# Patient Record
Sex: MALE | Race: WHITE | Employment: PART TIME | URBAN - METROPOLITAN AREA
[De-identification: names, ages, dates, MRNs, and addresses within clinical notes are randomized per-mention and may not be internally consistent; named-entity substitution may affect disease eponyms.]

---

## 2019-06-10 ENCOUNTER — HOSPITAL ENCOUNTER (EMERGENCY)
Age: 19
Discharge: HOME | End: 2019-06-10
Payer: COMMERCIAL

## 2019-06-10 ENCOUNTER — APPOINTMENT (OUTPATIENT)
Dept: GENERAL RADIOLOGY | Age: 19
End: 2019-06-10
Payer: COMMERCIAL

## 2019-06-10 VITALS
HEART RATE: 87 BPM | DIASTOLIC BLOOD PRESSURE: 76 MMHG | SYSTOLIC BLOOD PRESSURE: 115 MMHG | TEMPERATURE: 98.7 F | RESPIRATION RATE: 16 BRPM | OXYGEN SATURATION: 96 %

## 2019-06-10 DIAGNOSIS — S63.601A SPRAIN OF RIGHT THUMB, INITIAL ENCOUNTER: Primary | ICD-10-CM

## 2019-06-10 PROCEDURE — 73140 X-RAY EXAM OF FINGER(S): CPT

## 2019-06-10 PROCEDURE — 99283 EMERGENCY DEPT VISIT LOW MDM: CPT

## 2019-06-10 RX ORDER — IBUPROFEN 600 MG/1
600 TABLET ORAL EVERY 6 HOURS PRN
Qty: 30 TABLET | Refills: 0 | Status: ON HOLD | OUTPATIENT
Start: 2019-06-10 | End: 2020-08-03 | Stop reason: HOSPADM

## 2019-06-10 ASSESSMENT — PAIN SCALES - GENERAL: PAINLEVEL_OUTOF10: 3

## 2019-06-10 ASSESSMENT — PAIN DESCRIPTION - ORIENTATION: ORIENTATION: RIGHT

## 2019-06-10 ASSESSMENT — ENCOUNTER SYMPTOMS
VOMITING: 0
COLOR CHANGE: 0
NAUSEA: 0
ABDOMINAL PAIN: 0

## 2019-06-10 ASSESSMENT — PAIN DESCRIPTION - PAIN TYPE: TYPE: ACUTE PAIN

## 2019-06-10 ASSESSMENT — PAIN DESCRIPTION - LOCATION: LOCATION: FINGER (COMMENT WHICH ONE)

## 2020-07-30 ENCOUNTER — HOSPITAL ENCOUNTER (INPATIENT)
Age: 20
LOS: 4 days | Discharge: HOME | DRG: 885 | End: 2020-08-03
Attending: PSYCHIATRY & NEUROLOGY | Admitting: PSYCHIATRY & NEUROLOGY
Payer: COMMERCIAL

## 2020-07-30 ENCOUNTER — HOSPITAL ENCOUNTER (EMERGENCY)
Age: 20
Discharge: ANOTHER HOSPITAL | End: 2020-07-30
Attending: EMERGENCY MEDICINE
Payer: COMMERCIAL

## 2020-07-30 VITALS
HEART RATE: 85 BPM | WEIGHT: 185.4 LBS | TEMPERATURE: 97.4 F | SYSTOLIC BLOOD PRESSURE: 106 MMHG | RESPIRATION RATE: 16 BRPM | BODY MASS INDEX: 27.46 KG/M2 | OXYGEN SATURATION: 95 % | DIASTOLIC BLOOD PRESSURE: 65 MMHG | HEIGHT: 69 IN

## 2020-07-30 PROBLEM — F32.A DEPRESSION: Status: ACTIVE | Noted: 2020-07-30

## 2020-07-30 LAB
A/G RATIO: 1.7 (ref 1.1–2.2)
ACETAMINOPHEN LEVEL: <5 UG/ML (ref 10–30)
ALBUMIN SERPL-MCNC: 4.4 G/DL (ref 3.4–5)
ALP BLD-CCNC: 43 U/L (ref 40–129)
ALT SERPL-CCNC: 25 U/L (ref 10–40)
AMPHETAMINE SCREEN, URINE: NORMAL
ANION GAP SERPL CALCULATED.3IONS-SCNC: 14 MMOL/L (ref 3–16)
AST SERPL-CCNC: 28 U/L (ref 15–37)
BARBITURATE SCREEN URINE: NORMAL
BASOPHILS ABSOLUTE: 0.1 K/UL (ref 0–0.2)
BASOPHILS RELATIVE PERCENT: 0.6 %
BENZODIAZEPINE SCREEN, URINE: NORMAL
BILIRUB SERPL-MCNC: 0.4 MG/DL (ref 0–1)
BILIRUBIN URINE: NEGATIVE
BLOOD, URINE: NEGATIVE
BUN BLDV-MCNC: 29 MG/DL (ref 7–20)
CALCIUM SERPL-MCNC: 9.5 MG/DL (ref 8.3–10.6)
CANNABINOID SCREEN URINE: NORMAL
CHLORIDE BLD-SCNC: 107 MMOL/L (ref 99–110)
CLARITY: CLEAR
CO2: 22 MMOL/L (ref 21–32)
COCAINE METABOLITE SCREEN URINE: NORMAL
COLOR: YELLOW
CREAT SERPL-MCNC: 1.1 MG/DL (ref 0.9–1.3)
EOSINOPHILS ABSOLUTE: 0 K/UL (ref 0–0.6)
EOSINOPHILS RELATIVE PERCENT: 0.1 %
ETHANOL: NORMAL MG/DL (ref 0–0.08)
GFR AFRICAN AMERICAN: >60
GFR NON-AFRICAN AMERICAN: >60
GLOBULIN: 2.6 G/DL
GLUCOSE BLD-MCNC: 99 MG/DL (ref 70–99)
GLUCOSE URINE: NEGATIVE MG/DL
HCT VFR BLD CALC: 42.9 % (ref 40.5–52.5)
HEMOGLOBIN: 14.7 G/DL (ref 13.5–17.5)
KETONES, URINE: NEGATIVE MG/DL
LEUKOCYTE ESTERASE, URINE: NEGATIVE
LYMPHOCYTES ABSOLUTE: 2 K/UL (ref 1–5.1)
LYMPHOCYTES RELATIVE PERCENT: 16.7 %
Lab: NORMAL
MCH RBC QN AUTO: 29.4 PG (ref 26–34)
MCHC RBC AUTO-ENTMCNC: 34.3 G/DL (ref 31–36)
MCV RBC AUTO: 85.6 FL (ref 80–100)
METHADONE SCREEN, URINE: NORMAL
MICROSCOPIC EXAMINATION: NORMAL
MONOCYTES ABSOLUTE: 0.7 K/UL (ref 0–1.3)
MONOCYTES RELATIVE PERCENT: 5.9 %
NEUTROPHILS ABSOLUTE: 9.1 K/UL (ref 1.7–7.7)
NEUTROPHILS RELATIVE PERCENT: 76.7 %
NITRITE, URINE: NEGATIVE
OPIATE SCREEN URINE: NORMAL
OXYCODONE URINE: NORMAL
PDW BLD-RTO: 12.6 % (ref 12.4–15.4)
PH UA: 5.5
PH UA: 5.5 (ref 5–8)
PHENCYCLIDINE SCREEN URINE: NORMAL
PLATELET # BLD: 277 K/UL (ref 135–450)
PMV BLD AUTO: 9 FL (ref 5–10.5)
POTASSIUM REFLEX MAGNESIUM: 3.7 MMOL/L (ref 3.5–5.1)
PROPOXYPHENE SCREEN: NORMAL
PROTEIN UA: NEGATIVE MG/DL
RBC # BLD: 5.01 M/UL (ref 4.2–5.9)
SALICYLATE, SERUM: <0.3 MG/DL (ref 15–30)
SARS-COV-2, NAAT: NOT DETECTED
SODIUM BLD-SCNC: 143 MMOL/L (ref 136–145)
SPECIFIC GRAVITY UA: >=1.03 (ref 1–1.03)
TOTAL PROTEIN: 7 G/DL (ref 6.4–8.2)
URINE REFLEX TO CULTURE: NORMAL
URINE TYPE: NORMAL
UROBILINOGEN, URINE: 1 E.U./DL
WBC # BLD: 11.9 K/UL (ref 4–11)

## 2020-07-30 PROCEDURE — G0480 DRUG TEST DEF 1-7 CLASSES: HCPCS

## 2020-07-30 PROCEDURE — 1240000000 HC EMOTIONAL WELLNESS R&B

## 2020-07-30 PROCEDURE — 80307 DRUG TEST PRSMV CHEM ANLYZR: CPT

## 2020-07-30 PROCEDURE — 81003 URINALYSIS AUTO W/O SCOPE: CPT

## 2020-07-30 PROCEDURE — 80053 COMPREHEN METABOLIC PANEL: CPT

## 2020-07-30 PROCEDURE — U0002 COVID-19 LAB TEST NON-CDC: HCPCS

## 2020-07-30 PROCEDURE — 85025 COMPLETE CBC W/AUTO DIFF WBC: CPT

## 2020-07-30 PROCEDURE — 99284 EMERGENCY DEPT VISIT MOD MDM: CPT

## 2020-07-30 RX ORDER — MAGNESIUM HYDROXIDE/ALUMINUM HYDROXICE/SIMETHICONE 120; 1200; 1200 MG/30ML; MG/30ML; MG/30ML
30 SUSPENSION ORAL EVERY 6 HOURS PRN
Status: DISCONTINUED | OUTPATIENT
Start: 2020-07-30 | End: 2020-08-03 | Stop reason: HOSPADM

## 2020-07-30 RX ORDER — HYDROXYZINE PAMOATE 25 MG/1
50 CAPSULE ORAL EVERY 6 HOURS PRN
Status: DISCONTINUED | OUTPATIENT
Start: 2020-07-30 | End: 2020-07-31

## 2020-07-30 RX ORDER — OLANZAPINE 5 MG/1
5 TABLET ORAL EVERY 4 HOURS PRN
Status: DISCONTINUED | OUTPATIENT
Start: 2020-07-30 | End: 2020-07-31

## 2020-07-30 RX ORDER — BENZTROPINE MESYLATE 1 MG/ML
2 INJECTION INTRAMUSCULAR; INTRAVENOUS 2 TIMES DAILY PRN
Status: DISCONTINUED | OUTPATIENT
Start: 2020-07-30 | End: 2020-07-31

## 2020-07-30 RX ORDER — ACETAMINOPHEN 325 MG/1
650 TABLET ORAL EVERY 4 HOURS PRN
Status: DISCONTINUED | OUTPATIENT
Start: 2020-07-30 | End: 2020-07-31

## 2020-07-30 RX ORDER — TRAZODONE HYDROCHLORIDE 50 MG/1
50 TABLET ORAL ONCE
Status: DISCONTINUED | OUTPATIENT
Start: 2020-07-31 | End: 2020-08-03 | Stop reason: HOSPADM

## 2020-07-30 RX ORDER — OLANZAPINE 10 MG/1
10 INJECTION, POWDER, LYOPHILIZED, FOR SOLUTION INTRAMUSCULAR 3 TIMES DAILY PRN
Status: DISCONTINUED | OUTPATIENT
Start: 2020-07-30 | End: 2020-07-31

## 2020-07-30 RX ORDER — TRAZODONE HYDROCHLORIDE 50 MG/1
50 TABLET ORAL NIGHTLY PRN
Status: DISCONTINUED | OUTPATIENT
Start: 2020-07-31 | End: 2020-07-31

## 2020-07-30 SDOH — SOCIAL STABILITY: SOCIAL NETWORK
DO YOU BELONG TO ANY CLUBS OR ORGANIZATIONS SUCH AS CHURCH GROUPS UNIONS, FRATERNAL OR ATHLETIC GROUPS, OR SCHOOL GROUPS?: NO

## 2020-07-30 SDOH — HEALTH STABILITY: PHYSICAL HEALTH: ON AVERAGE, HOW MANY MINUTES DO YOU ENGAGE IN EXERCISE AT THIS LEVEL?: 150+ MIN

## 2020-07-30 SDOH — ECONOMIC STABILITY: FOOD INSECURITY: WITHIN THE PAST 12 MONTHS, YOU WORRIED THAT YOUR FOOD WOULD RUN OUT BEFORE YOU GOT MONEY TO BUY MORE.: NEVER TRUE

## 2020-07-30 SDOH — HEALTH STABILITY: MENTAL HEALTH
STRESS IS WHEN SOMEONE FEELS TENSE, NERVOUS, ANXIOUS, OR CAN'T SLEEP AT NIGHT BECAUSE THEIR MIND IS TROUBLED. HOW STRESSED ARE YOU?: VERY MUCH

## 2020-07-30 SDOH — SOCIAL STABILITY: SOCIAL INSECURITY: WITHIN THE LAST YEAR, HAVE YOU BEEN AFRAID OF YOUR PARTNER OR EX-PARTNER?: PATIENT DECLINED

## 2020-07-30 SDOH — SOCIAL STABILITY: SOCIAL NETWORK: HOW OFTEN DO YOU GET TOGETHER WITH FRIENDS OR RELATIVES?: ONCE A WEEK

## 2020-07-30 SDOH — SOCIAL STABILITY: SOCIAL NETWORK: ARE YOU MARRIED, WIDOWED, DIVORCED, SEPARATED, NEVER MARRIED, OR LIVING WITH A PARTNER?: NEVER MARRIED

## 2020-07-30 SDOH — ECONOMIC STABILITY: INCOME INSECURITY: HOW HARD IS IT FOR YOU TO PAY FOR THE VERY BASICS LIKE FOOD, HOUSING, MEDICAL CARE, AND HEATING?: NOT HARD AT ALL

## 2020-07-30 SDOH — SOCIAL STABILITY: SOCIAL INSECURITY
WITHIN THE LAST YEAR, HAVE YOU BEEN KICKED, HIT, SLAPPED, OR OTHERWISE PHYSICALLY HURT BY YOUR PARTNER OR EX-PARTNER?: PATIENT DECLINED

## 2020-07-30 SDOH — SOCIAL STABILITY: SOCIAL INSECURITY
WITHIN THE LAST YEAR, HAVE YOU BEEN HUMILIATED OR EMOTIONALLY ABUSED IN OTHER WAYS BY YOUR PARTNER OR EX-PARTNER?: PATIENT DECLINED

## 2020-07-30 SDOH — SOCIAL STABILITY: SOCIAL NETWORK: IN A TYPICAL WEEK, HOW MANY TIMES DO YOU TALK ON THE PHONE WITH FAMILY, FRIENDS, OR NEIGHBORS?: ONCE A WEEK

## 2020-07-30 SDOH — ECONOMIC STABILITY: TRANSPORTATION INSECURITY
IN THE PAST 12 MONTHS, HAS THE LACK OF TRANSPORTATION KEPT YOU FROM MEDICAL APPOINTMENTS OR FROM GETTING MEDICATIONS?: NO

## 2020-07-30 SDOH — SOCIAL STABILITY: SOCIAL INSECURITY
WITHIN THE LAST YEAR, HAVE TO BEEN RAPED OR FORCED TO HAVE ANY KIND OF SEXUAL ACTIVITY BY YOUR PARTNER OR EX-PARTNER?: PATIENT DECLINED

## 2020-07-30 SDOH — SOCIAL STABILITY: SOCIAL NETWORK: HOW OFTEN DO YOU ATTEND CHURCH OR RELIGIOUS SERVICES?: NEVER

## 2020-07-30 SDOH — ECONOMIC STABILITY: FOOD INSECURITY: WITHIN THE PAST 12 MONTHS, THE FOOD YOU BOUGHT JUST DIDN'T LAST AND YOU DIDN'T HAVE MONEY TO GET MORE.: NEVER TRUE

## 2020-07-30 SDOH — SOCIAL STABILITY: SOCIAL NETWORK: HOW OFTEN DO YOU ATTENT MEETINGS OF THE CLUB OR ORGANIZATION YOU BELONG TO?: NEVER

## 2020-07-30 SDOH — ECONOMIC STABILITY: TRANSPORTATION INSECURITY
IN THE PAST 12 MONTHS, HAS LACK OF TRANSPORTATION KEPT YOU FROM MEETINGS, WORK, OR FROM GETTING THINGS NEEDED FOR DAILY LIVING?: NO

## 2020-07-30 SDOH — HEALTH STABILITY: PHYSICAL HEALTH: ON AVERAGE, HOW MANY DAYS PER WEEK DO YOU ENGAGE IN MODERATE TO STRENUOUS EXERCISE (LIKE A BRISK WALK)?: 5 DAYS

## 2020-07-30 ASSESSMENT — SLEEP AND FATIGUE QUESTIONNAIRES
DIFFICULTY FALLING ASLEEP: YES
DO YOU USE A SLEEP AID: NO
RESTFUL SLEEP: NO
DIFFICULTY ARISING: NO
DIFFICULTY STAYING ASLEEP: YES
AVERAGE NUMBER OF SLEEP HOURS: 3
DO YOU HAVE DIFFICULTY SLEEPING: YES
SLEEP PATTERN: DIFFICULTY FALLING ASLEEP;DISTURBED/INTERRUPTED SLEEP;EARLY AWAKENING;INSOMNIA

## 2020-07-30 ASSESSMENT — PAIN SCALES - GENERAL
PAINLEVEL_OUTOF10: 0
PAINLEVEL_OUTOF10: 0

## 2020-07-30 NOTE — ED NOTES
Patient presents via private car with mother for SI/HI. He is calm and cooperative while doing assessment. He states he is overwhelmed with life, his relationship, and his job. He states it is nothing in particular, just it all together. He states there are guns in the home but he does not know where they are currently are. Per his mom they are in a safe and the patient does not have the combination.   Patient does not have a time frame to do this and has not attempted suicide in the past.       Manisha Ball RN  07/30/20 8001

## 2020-07-30 NOTE — ED PROVIDER NOTES
[address] [facility]      Pt Name: Julio De  MRN: [id number]  [place] 2000  Date of evaluation: 7/30/2020  Provider: Hannah Reynolds MD    CHIEF COMPLAINT       Chief Complaint   Patient presents with   [employer] Psychiatric Evaluation         HISTORY OF PRESENT ILLNESS   (Location/Symptom, Timing/Onset,Context/Setting, Quality, Duration, Modifying Factors, Severity)  Note limiting factors. Julio De is a 22 y.o. male who presents to the emergency department complaining of worsening depression and anxiety. Patient reports that approximately 6 weeks ago his dose of Celexa was increased and he began having more issues with depression, anxiety, and anger. He then stopped this medication about a month ago, and symptoms have gradually worsened. He states that he now has had more thoughts of both suicidal and homicidal ideation. He states that he feels insignificant, and anyone who thinks that their life is significant is following themselves. He denies any specific suicidal homicidal plans, but states he is thought about assaulting people or possibly swerving into traffic. Per nurses discussion with patient's mother, there are guns in the home, locked in the safe the patient does not have access to. However, patient's mother reported he has been increasingly aggressive out asking her for access to the weapons. He denies any physical complaints at the time of my evaluation, specifically denies chest pain, shortness breath, fever, abdominal pain, nausea or vomiting. NursingNotes were reviewed. REVIEW OF SYSTEMS    (2-9 systems for level 4, 10 or more for level 5)       Constitutional: No fever or chills. Eye: No visual disturbances. No eye pain. Ear/Nose/Mouth/Throat: No nasal congestion. No sore throat. Respiratory: No cough, No shortness of breath, No sputum production. Cardiovascular: No chest pain.  No palpitations. Gastrointestinal: No abdominal pain. No nausea or vomiting  Genitourinary: No dysuria. No hematuria. Hematology/Lymphatics: No bleeding or bruising tendency. Immunologic: No malaise. No swollen glands. Musculoskeletal: No back pain. No joint pain. Integumentary: No rash. No abrasions. Neurologic: No headache. No focal numbness or weakness. PAST MEDICAL HISTORY     Past Medical History:   Diagnosis Date    Environmental allergies          SURGICALHISTORY       Past Surgical History:   Procedure Laterality Date    TYMPANOSTOMY TUBE PLACEMENT Bilateral     WISDOM TOOTH EXTRACTION           CURRENT MEDICATIONS       Previous Medications    IBUPROFEN (ADVIL;MOTRIN) 600 MG TABLET    Take 1 tablet by mouth every 6 hours as needed for Pain       ALLERGIES     Patient has no known allergies. FAMILY HISTORY     No family history on file.        SOCIAL HISTORY       Social History     Socioeconomic History    Marital status: Single     Spouse name: Not on file    Number of children: Not on file    Years of education: Not on file    Highest education level: Not on file   Occupational History    Not on file   Social Needs    Financial resource strain: Not on file    Food insecurity     Worry: Not on file     Inability: Not on file    Transportation needs     Medical: Not on file     Non-medical: Not on file   Tobacco Use    Smoking status: Never Smoker    Smokeless tobacco: Never Used   Substance and Sexual Activity    Alcohol use: No    Drug use: No    Sexual activity: Not on file   Lifestyle    Physical activity     Days per week: Not on file     Minutes per session: Not on file    Stress: Not on file   Relationships    Social connections     Talks on phone: Not on file     Gets together: Not on file     Attends Quaker service: Not on file     Active member of club or organization: Not on file     Attends meetings of clubs or organizations: Not on file     Relationship status: [facility] [address]   Phone [phone]   COMPREHENSIVE METABOLIC PANEL W/ REFLEX TO MG FOR LOW K - Abnormal; Notable for the following components:    BUN 29 (*)     All other components within normal limits    Narrative:     Performed at:  [facility]  [address] [facility] [address]   Phone [phone]   SALICYLATE LEVEL - Abnormal; Notable for the following components:    Salicylate, Serum <7.5 (*)     All other components within normal limits    Narrative:     Performed at:  [facility]  [address], [place] [address]   Phone [phone]   ACETAMINOPHEN LEVEL - Abnormal; Notable for the following components:    Acetaminophen Level <5 (*)     All other components within normal limits    Narrative:     Performed at:  [facility]  [address], [place] [address]   Phone [phone]   ETHANOL    Narrative:     Performed at:  [facility] [employer] [facility]  [address] [address]   Phone [phone]   URINE RT REFLEX TO CULTURE    Narrative:     Performed at:  [facility]  [address], [place] [facility] [address]   Phone [phone]   URINE DRUG SCREEN    Narrative:     Performed at:  [facility]  [address], [facility] [address]   Phone [phone]       All other labs were within normal range or not returned as of this dictation.     EMERGENCY DEPARTMENT COURSE and DIFFERENTIAL DIAGNOSIS/MDM:   Vitals:    Vitals:    20 1213   BP: 127/69   Pulse: 122   Resp: 17   Temp: 99.6 °F (37.6 °C)   TempSrc: Temporal   SpO2: 97%   Weight: 185 lb 6.4 oz (84.1 kg)   Height: 5' 9\" (1.753 m)         Medical decision makin-year-old male with history of anxiety depression who presents for evaluation of worsening depression and anger issues, now with suicidal and homicidal ideation, though no specific plan. Patient has no physical complaints at the time of my evaluation. He is well-appearing, afebrile, with normal vital signs. Physical exam is unremarkable. Medical screening laboratory studies including CBC, CMP are within normal limits. Salicylate, ethanol, Tylenol levels negative. Urinalysis is noninfectious. At this time, patient is medically cleared for further psychiatric evaluation and will be transferred to a psychiatric facility. CRITICAL CARE TIME   Total Critical Care time was 0 minutes, excluding separately reportable procedures. There was a high probability of clinically significant/life threatening deterioration in the patient's condition which required my urgent intervention. CONSULTS:  IP CONSULT TO PSYCHIATRY    PROCEDURES:  Unless otherwise noted below, none         FINAL IMPRESSION      1. Suicidal thoughts          DISPOSITION/PLAN   DISPOSITION Decision To Transfer 07/30/2020 02:17:19 PM      PATIENT REFERRED TO:  No follow-up provider specified.     DISCHARGE MEDICATIONS:  New Prescriptions    No medications on file          (Please note that portions of this note were completed with a voice recognition program.Efforts were made to edit the dictations but occasionally words are mis-transcribed.)    Sparkle Peters MD (electronically signed)  Attending Emergency Physician        Sparkle Peters MD  07/30/20 3568

## 2020-07-30 NOTE — ED NOTES
Report received from [facility]. Pt is awake, alert, drinking water at this time. Pt aware of need for urine sample.       Shakira Romero  07/30/20 4107

## 2020-07-30 NOTE — ED NOTES
Patient in room with  at bedside, he is currently getting into all hospital clothes and belongings will be locked up     Laurie Foss RN  07/30/20 2511

## 2020-07-31 PROBLEM — F32.2 CURRENT SEVERE EPISODE OF MAJOR DEPRESSIVE DISORDER WITHOUT PSYCHOTIC FEATURES WITHOUT PRIOR EPISODE (HCC): Status: ACTIVE | Noted: 2020-07-30

## 2020-07-31 PROBLEM — F41.1 GAD (GENERALIZED ANXIETY DISORDER): Status: ACTIVE | Noted: 2020-07-31

## 2020-07-31 LAB
EKG ATRIAL RATE: 80 BPM
EKG DIAGNOSIS: NORMAL
EKG P AXIS: 28 DEGREES
EKG P-R INTERVAL: 160 MS
EKG Q-T INTERVAL: 388 MS
EKG QRS DURATION: 92 MS
EKG QTC CALCULATION (BAZETT): 447 MS
EKG R AXIS: 79 DEGREES
EKG T AXIS: 64 DEGREES
EKG VENTRICULAR RATE: 80 BPM

## 2020-07-31 PROCEDURE — 93010 ELECTROCARDIOGRAM REPORT: CPT | Performed by: INTERNAL MEDICINE

## 2020-07-31 PROCEDURE — 97535 SELF CARE MNGMENT TRAINING: CPT

## 2020-07-31 PROCEDURE — 99221 1ST HOSP IP/OBS SF/LOW 40: CPT | Performed by: PHYSICIAN ASSISTANT

## 2020-07-31 PROCEDURE — 1240000000 HC EMOTIONAL WELLNESS R&B

## 2020-07-31 PROCEDURE — 99223 1ST HOSP IP/OBS HIGH 75: CPT | Performed by: PSYCHIATRY & NEUROLOGY

## 2020-07-31 PROCEDURE — 93005 ELECTROCARDIOGRAM TRACING: CPT | Performed by: PSYCHIATRY & NEUROLOGY

## 2020-07-31 PROCEDURE — 97165 OT EVAL LOW COMPLEX 30 MIN: CPT

## 2020-07-31 PROCEDURE — 97530 THERAPEUTIC ACTIVITIES: CPT

## 2020-07-31 PROCEDURE — 6370000000 HC RX 637 (ALT 250 FOR IP): Performed by: PSYCHIATRY & NEUROLOGY

## 2020-07-31 RX ORDER — LORAZEPAM 0.5 MG/1
0.5 TABLET ORAL EVERY 4 HOURS PRN
Status: DISCONTINUED | OUTPATIENT
Start: 2020-07-31 | End: 2020-08-03 | Stop reason: HOSPADM

## 2020-07-31 RX ORDER — ACETAMINOPHEN 325 MG/1
650 TABLET ORAL EVERY 4 HOURS PRN
Status: DISCONTINUED | OUTPATIENT
Start: 2020-07-31 | End: 2020-08-03 | Stop reason: HOSPADM

## 2020-07-31 RX ADMIN — SERTRALINE HYDROCHLORIDE 25 MG: 50 TABLET ORAL at 13:20

## 2020-07-31 ASSESSMENT — PATIENT HEALTH QUESTIONNAIRE - PHQ9: SUM OF ALL RESPONSES TO PHQ QUESTIONS 1-9: 20

## 2020-07-31 ASSESSMENT — SLEEP AND FATIGUE QUESTIONNAIRES
DIFFICULTY ARISING: YES
DO YOU USE A SLEEP AID: COMMENT
AVERAGE NUMBER OF SLEEP HOURS: 4
SLEEP PATTERN: DIFFICULTY FALLING ASLEEP;DISTURBED/INTERRUPTED SLEEP
DIFFICULTY FALLING ASLEEP: YES
RESTFUL SLEEP: NO
DIFFICULTY STAYING ASLEEP: YES
DO YOU HAVE DIFFICULTY SLEEPING: YES

## 2020-07-31 ASSESSMENT — LIFESTYLE VARIABLES: HISTORY_ALCOHOL_USE: NO

## 2020-07-31 ASSESSMENT — PAIN SCALES - GENERAL: PAINLEVEL_OUTOF10: 0

## 2020-07-31 NOTE — PROGRESS NOTES
Patient vital signs are obtained. Patient is asked if he is currently suicidal or homicidal. He denied both suicidal and homicidal ideation at this time. He is asked to come to the staff if thoughts of self harm were to occur during his stay. He is agreeable to do this.

## 2020-07-31 NOTE — FLOWSHEET NOTE
07/31/20 0904   Psychiatric History   Psychiatric history treatment Current treatment  ([facility] - Nadja Llamas (Therapist) [phone],VGY on Celexa prescribed by PCP, Dr Shelly Jordan)   Are there any medication issues? Yes  (Stopped taking Celexa d/t increased aggitation.)   Support System   Support system Adequate   Types of Support System Mother;Father   Problems in support system Other (Comment)  (\"complicated\" relationship with ex. \"Not super social.\")   Current Living Situation   Home Living Adequate   Living information Lives with others   Problems with living situation  No   Lack of basic needs No   SSDI/SSI Denied   Other government assistance Denied   Problems with environment Denied   Current abuse issues Denied   Supervised setting None   Relationship problems No   Medical and Self-Care Issues   Relevant medical problems asthma   Relevant self-care issues Denied   Barriers to treatment No   Family Constellation   Spouse/partner-name/age Denied   Children-names/ages Denied   Parents Chapo Leija - mother (46years old);  Nhan Levine - father (48years old); currently    Siblings 3 half sister from father's first marriage but reports no relationship   Support services   (Denied)   Childhood   Raised by Biological mother;Biological father   Biological mother Dilia Bradley father Quintin Russian   Relevant family history Pt reported being raised by his biological parents; pt reported he currently lives with both parents   History of abuse No   Legal History   Legal history No   Juvenile legal history No    Abuse Assessment   Physical Abuse Denies   Verbal Abuse Denies   Emotional abuse Denies   Financial Abuse Denies   Sexual abuse Denies   Elder abuse No   Substance Use   Use of substances  No   Motivation for SA Treatment   Stage of engagement Active treatment/relapse prevention   Motivation for treatment Yes   Current barriers to treatment Other  (denied)   Education   Education Other mother (54 years old); Germaine Lovelace - father (48years old); currently    Siblings 3 half sister from father's first marriage but reports no relationship   Support services   (Denied)   Childhood   Raised by Biological mother;Biological father   Biological mother Mino Chase father Shanice Fu   Relevant family history Pt reported being raised by his biological parents; pt reported he currently lives with both parents   History of abuse No   Legal History   Legal history No   Juvenile legal history No    Abuse Assessment   Physical Abuse Denies   Verbal Abuse Denies   Emotional abuse Denies   Financial Abuse Denies   Sexual abuse Denies   Elder abuse No   Substance Use   Use of substances  No   Motivation for SA Treatment   Stage of engagement Active treatment/relapse prevention   Motivation for treatment Yes   Current barriers to treatment Other  (denied)   Education   Education Other (comment)  (1 year of college at [employer])   Special education   (Denied)   Work History   Currently employed Yes  (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[employer])   Recent job loss or change   (None)    service   (Denied)   /VA involvement Denied   Leisure/Activity   Past interests Pt reported he used to play video games competitively   Present interests Video games,   Current daily activity Wake up at 3 am and go to work; home by 11 am, meal, sleep wake up at 3 pm - 4 pm, watch tv or video games   Social with friends/family No  (Socialize with friends through video game but not socializing with others.)   Cultural and Spiritual   Spiritual concerns No   Cultural concerns No   Collateral Contacts   Contacts Family   Support System JOSE Signed Yes  (Pt reported signing JOSE for parents so that staff can coordinate care. Pt vocalized being jeanine with staff contacting outpatient therapist.)     Therapist met with pt to complete psychosocial assessment and CSSRS.   Pt appeared depressed and reported lack of interest/pleasure in activities, sleep disturbances, depressed mood, and isolating behaviors. Pt reported completing LUIS CARLOS 7 with therapist and having been advised his score was rather high. Pt reported that he does not see a psychiatrist but PCP prescribed antidepressant that made him feel more agitated; pt denied improvement with medications so he stopped taking them. Pt vocalized being motivated to continue therapy however not sure about taking medications. Pt reported having thoughts of wanting to be dead frequently but denied plan or intent; pt expressed that what keeps him from doing anything is not know what there is in the after life and his parents. Pt is agreeable with including parents in treatment while hospitalized; pt reported that he lives with both biological parents.     JOHNNY Clark, Valerie [facility] 320, LSW

## 2020-07-31 NOTE — PROGRESS NOTES
Inpatient Occupational Therapy  Evaluation and Treatment  Discharge Summary    Unit: Adult-[facility]  Date:  7/31/2020  Patient Name:    Jose Ramírez  Admitting diagnosis:  Depression, unspecified depression type [F32.9]  Admit Date:  7/30/2020  Precautions/Restrictions/WB Status/ Lines/ Wounds/ Oxygen: General [facility] precautions     Treatment Time:  4194-0632  Treatment Number: 1     Billable Treatment Time: 25 minutes   Total Treatment Time:   35   minutes    Patient Goals for Therapy:  \" to get better \"      Discharge Recommendations: Home with PRN assist  DME needs for discharge: None       Therapy recommendations for staff: Independent ambulation    History of Present Illness: From H&P, Janna:   \" This is a domiciled, never , partially employed 61-year-old with a history of mood and anxiety symptoms, who was brought to [facility] by his mother after his therapist recommended he present for assessment. He presented with worsening depression, anxiety, and suicidal ideation. \"      PMHx: environmental allergies     Home Health S4 Level Recommendation:  NA  AM-PAC Score: AM-PAC Inpatient Daily Activity Raw Score: 24    Preadmission Environment    Pt. Lives with family (parents)    Preadmission Status / PLOF:  History of falls   No  Pt. Able to drive   Yes   (Pt has his own car and an active license.)   Pt Fully independent with ADL's  Yes  Pt. Required assistance from family for: Independent PTA    Pt. Fully independent for transfers and gait and walked with: No Device     Sleep Hygiene: Pt reports he has always had difficulty sleeping. Pt reports he works nights which impacts his sleep schedule dramatically.    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 Pt agreeable to this OT eval & tx. Upper Extremity ROM:    WFL,  pt able to perform all bed mobility, transfers, and gait without ROM limitation. Upper Extremity Strength:    BUE strength WFL, but not formally assessed w/ MMT    Upper Extremity Sensation    WNL    Upper Extremity Proprioception:  WNL    Coordination and Tone  WNL    Balance  Functional Sitting Balance: WNL  Functional Standing Balance: WNL    Bed mobility:    Supine to sit: Independent  Sit to supine:   Independent  Rolling:    Not Tested  Scooting in sitting:  Independent  Scooting to head of bed: Independent    Bridging:   Not Tested    Transfers:    Sit to stand:  Independent  Stand to sit: Independent  Bed to chair:   Not Tested  Standard toilet: Not Tested  Bed to [facility]:  Not Tested    Dressing:      UE:   Not Tested  LE:    Independent    Bathing:    UE:  Not Tested  LE:  Not Tested    Eating:   Independent    Toileting:  Not Tested    Activity Tolerance   Pt completed therapy session with No adverse symptoms noted w/activity    Positioning Needs:   Pt returned to bed at end of session with needs in place. Exercise / Activities Initiated:   Sleep Hygiene: Pt participated in conversation about sleep difficulties and problem solved different things to try. Coping skills: Pt appropriately defined coping skill and identified 3 skills. Pt reports he has been talking about coping skills with his personal therapist. Pt acknowledges that not all of his coping skills are positive. Groups: Pt participated in conversation about the importance of groups. Pt reports he is \"shy\" and does not like groups of people.  Pt not agreeable to groups at this time. Patient/Family Education:   Role of OT  Recommendations for DC   Sleep, coping, groups    Assessment of Deficits:   Pt demonstrated impaired sleep, however, not limiting completion of daily needs. Pt is not in need of acute skilled occupational therapy services at this time. Pt participated in conversation and education about sleep hygiene. Pt discharged from acute skilled occupational therapy. No goals set. No plan of care established. Please reorder if pt status changes. Thank you.      Orpha Sample, MOT, OTR/L   [id number]           If patient discharges from this facility prior to next visit, this note will serve as the Discharge Summary

## 2020-07-31 NOTE — H&P
Hospital Medicine History & Physical      PCP: Monika Lewis    Date of Admission: 7/30/2020    Date of Service: Pt seen/examined on 7/31/2020    Chief Complaint:  Depression/Anxiety     History Of Present Illness: The patient is a 22 y.o. male with depression who presented to [facility] with complaint of SI/HI and worsening depression. Patient was seen and evaluated in the ED by the ED medical provider, patient was medically cleared for admission to [facility]. This note serves as an admission medical H&P.    PCP: Jessica RUVALCABA  Tobacco use: denies  ETOH use: denies  Illicit drug use: denies    Patient denies any medical complaints. Past Medical History:        Diagnosis Date    Environmental allergies        Past Surgical History:        Procedure Laterality Date    TYMPANOSTOMY TUBE PLACEMENT Bilateral     WISDOM TOOTH EXTRACTION         Medications Prior to Admission:    Prior to Admission medications    Medication Sig Start Date End Date Taking? Authorizing Provider   ibuprofen (ADVIL;MOTRIN) 600 MG tablet Take 1 tablet by mouth every 6 hours as needed for Pain 6/10/19   Briseida Childress PA-C       Allergies:  Patient has no known allergies. Social History:  The patient currently lives at home     TOBACCO:   reports that he has never smoked. He has never used smokeless tobacco.  ETOH:   reports no history of alcohol use. Family History:   Positive as follows:    No family history on file.     REVIEW OF SYSTEMS:       Constitutional: Negative for fever   HENT: Negative for sore throat   Eyes: Negative for redness   Respiratory: Negative  for dyspnea, cough   Cardiovascular: Negative for chest pain   Gastrointestinal: Negative for vomiting, diarrhea   Genitourinary: Negative for hematuria   Musculoskeletal: Negative for arthralgias   Skin: Negative for rash   Neurological: Negative for syncope    Hematological: Negative for easy bruising/bleeding   Psychiatric/Behavorial: Per psychiatry team evaluation     PHYSICAL EXAM:    /65   Pulse 82   Temp 97.8 °F (36.6 °C) (Temporal)   Resp 16   Ht 5' 9\" (1.753 m)   Wt 185 lb (83.9 kg)   SpO2 97%   BMI 27.32 kg/m²     Gen: No distress. Alert. Eyes: No conjunctival injection. ENT: No discharge. Pharynx clear. Neck: Trachea midline. Resp: No accessory muscle use. No crackles. No wheezes. No rhonchi. CV: Regular rate. Regular rhythm. No murmur. No rub. No edema. GI: Non-tender. Non-distended. Normal bowel sounds. Skin: Warm and dry. M/S: No cyanosis. No joint deformity. No clubbing. Neuro: Awake. No focal neurologic deficit on exam.  Cranial nerves II through XII intact. Patient is able to ambulate without difficulty. Psych: Per psychiatry team evaluation     CBC:   Recent Labs     07/30/20  1245   WBC 11.9*   HGB 14.7   HCT 42.9   MCV 85.6        BMP:   Recent Labs     07/30/20  1245      K 3.7      CO2 22   BUN 29*   CREATININE 1.1     LIVER PROFILE:   Recent Labs     07/30/20  1245   AST 28   ALT 25   BILITOT 0.4   ALKPHOS 43     UA:  Recent Labs     07/30/20  1408   COLORU Yellow   PHUR 5.5  5.5   CLARITYU Clear   SPECGRAV >=1.030   LEUKOCYTESUR Negative   UROBILINOGEN 1.0   BILIRUBINUR Negative   BLOODU Negative   GLUCOSEU Negative     CULTURES  SARS-CoV-2: not detected     EKG:  I have reviewed the EKG with the following interpretation:   NSR, normal axis, normal interval, no acute ST segment changes concerning for acute ischemia     RADIOLOGY  No orders to display       Pertinent previous results reviewed   None     ASSESSMENT/PLAN:  Depression   - per psychiatry team    Leukocytosis   - mild   - no acute infectious source     Pt has no medical complaints at this time. Pt was informed that they may have [facility] contact us should any medical concerns arise during this admission.     Jarod Richardson PA-C  7/31/2020 11:45 AM

## 2020-07-31 NOTE — BH NOTE
[facility]  Initial Interdisciplinary Treatment Plan NOTE    Review Date & Time: 7/31/2020 0927    Patient was not in treatment team    Admission Type:   Admission Type: Voluntary(signed voluntary upon arrival to the unit.)    Reason for admission:  Reason for Admission: Patient told staff at [facility] that he was suicidal and homocidal, was thinking about self destruction by running in to traffic and getting hit by a car, or starting arguement with someone to become involved in an assault. patient is feeling anxious and depressed. He is unable to identify why. He is in counseling at [facility].       Estimated Length of Stay Update:  2-3 days  Estimated Discharge Date Update: 8/3/2020    PATIENT STRENGTHS:  Patient Strengths Strengths: No significant Physical Illness, Other(Pt reported having above average intelligence.)  Patient Strengths and Limitations:Limitations: Difficult relationships / poor social skills, Tendency to isolate self, Demonstrates discomfort with /lack of social skills  Addictive Behavior:Addictive Behavior  In the past 3 months, have you felt or has someone told you that you have a problem with:  : Internet Use(Pt reported parents expressing concern over amount of time playing video games.)  Do you have a history of Chemical Use?: No  Do you have a history of Alcohol Use?: No  Do you have a history of Street Drug Abuse?: No  Histroy of Prescripton Drug Abuse?: No  Medical Problems:  Past Medical History:   Diagnosis Date    Environmental allergies        EDUCATION:   Learner Progress Toward Treatment Goals: Reviewed goals and plan of care    Method: Individual    Outcome: Verbalized understanding and Needs reinforcement    PATIENT GOALS:Patient did not attend goals group    PLAN/TREATMENT RECOMMENDATIONS UPDATE: Patient will take medication as prescribed, eat 75% of meals, attend groups, participate in milieu activities, participate in treatment team and care planning for discharge and follow up.     GOALS UPDATE:   Time frame for Short-Term Goals: 1-2 days    Kenia Luis RN

## 2020-07-31 NOTE — PROGRESS NOTES
Discussed patient with Dr. Pearl Boyer. Patient maintains that he is not suicidal or homicidal upon arrival to [facility] and during admission assessment. Suicide precautions were discontinued.

## 2020-07-31 NOTE — FLOWSHEET NOTE
07/31/20 1017   Activities of Daily Living   Patient Requires assistance with daily self-care activities? No   Leisure Activity 1   3 Favorite Leisure Activities \"Play video games. \"   Frequency weekly   Last time this week   Barriers to participating  motivation   Leisure Activity 2   [address]  \"Write. \"   Frequency  weekly   Last time  this week   Barriers to participating  motivation   Leisure Activity 3   [address]  \"Read. \"   Frequency  weekly   Last time  this week   Barriers to participating  motivation   Social   Patient reports spending the majority of their free time alone   Patient verbalizes a preference for spending free time alone   Patients perception of support system recently disrupted  (\"My parents are primarily my support system. I recently broke up with my girlfriend. We still talk, but it's not the same. \")   Patients perception of barriers to socializing with others include(s) lack of motivation/interest;lack of comfort   Social Details \"My parents. \"   Beliefs & Coping   Has difficulty dealing with feelings   Yes   Internalizes feelings/Keeps feelings in Yes   Externalizes feelings through aggressiveness or poor temper control  Yes   Feels uncomfortable around others  Yes   Has difficulty talking to others  Yes   Depends on others for direction or decisions No   Difficulty dealing with anger of others  Yes   Difficulty dealing with own anger  Yes   Difficulty managing stress Yes   Frequently has difficulty with relationships  Yes   which,who,where at work;with friends/peers   Has recently perceived/experienced loss, disappointment, humiliation or failure  Yes   General perception about self does not like self   Attitude about abilities occasionally fails   Locus of Control  sometimes   Belief about recovery Recovery is possible   Patient Identified Strengths  \"My open mindendness. I would say above average intelligence.  I'm not the brightest crayon in the box, but I'm in the box. \"   Patient Identified Limitations  \"I am not a super social person. I like being by myself. \" \"I'm not very confident and secure. I'm not a fan of myself, but I like to spend time with myself. Maybe it's a comfort thing. It's a comfort thing. \"   Perception of most stressful event prior to hospitalization \"It's not the breakup itself, that hurt, it's everything surrounding it. We broke up, but we both still have feelings, but we can't be together. I didn't decide or not want be together. I still get told that she has feelings for me and wants to be together. \"   Perception of changes needed \"My job. Probably find a different one altogether, just because of the whole infastructure and chain of command. Feels like you're constantly playing games and getting told one thing one day and another a diffrent day. 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 \"   Strengths and Limitations   Strengths Independent in basic self-care activities;Demonstrates basic social skills   Limitations Difficult relationships / poor social skills; Tendency to isolate self;Demonstrates discomfort with Lethia Champ of social skills     Therapist met with [place] and completed Leisure Assessment. [facility] requested to be connected to a community psychiatrist which therapist relayed to the hospital .

## 2020-07-31 NOTE — H&P
[address]                 [address] [address]                              HISTORY AND PHYSICAL    PATIENT NAME: Diane Mednosa                       :        2000  MED REC NO:   [id number]                          ROOM:       2319  ACCOUNT NO:   [de-identified]                           ADMIT DATE: 2020  PROVIDER:     Katelyn Birmingham MD    IDENTIFICATION:  This is a domiciled, never , partially employed  61-year-old with a history of mood and anxiety symptoms, who was brought to   [facility] by his mother after his therapist recommended he  present for assessment. He presented with worsening depression,  anxiety, and suicidal ideation. SOURCES OF INFORMATION:  The patient. ED record. CHIEF COMPLAINT:  \"Worsening depression and anxiety. \"    HISTORY OF PRESENT ILLNESS:  The patient reports that over the last year  his depression has gotten increasingly worse. He describes low mood,  anhedonia, fatigue, decreased concentration, self-reproach, amotivation,  tearfulness, excessive guilt, initiation and maintenance insomnia, and  suicidality. He has fantasies about not existing anymore; that people will be better off without him. He has thoughts about walking into traffic or in front of a baggage cart at work. He also describes ongoing symptoms of anxiety, especially social  anxiety. PSYCHIATRIC REVIEW OF SYSTEMS:  No ind[place]. No psychosis. STRESSORS:  Work-related stressors. He is getting mixed  messages from his employer about what opportunities are available. He also starts work at 03:00 a.m. Recent breakup with girlfriend, who was also sending him mixed messages about whether they should continue seeing each other. PAST PSYCHIATRIC HISTORY:  No previous hospitalizations. No suicide  attempts.   He has never seen a psychiatrist.  He has an outpatient  therapist he sees at [facility]; they have been working on anxiety and depression. His PCP prescribed him Celexa a while ago and increased it to 40 mg a couple of  months ago. He stopped it 1 month ago because it made him more  agitated. He says that when he first started taking it, he found it  helpful. No other psychiatric medications. Systematically reviewed for a history of manic symptoms - negative. SUBSTANCE USE HISTORY:  No substances. No treatment programs. PAST MEDICAL HISTORY:  Asthma. No traumatic brain injuries. No  seizures. He had ear tubes when he was younger, but no other surgeries. FAMILY PSYCHIATRIC HISTORY:  Father, bipolar affective disorder. he has seen his father in a manic episode. No completed suicides. CURRENT MEDICATIONS:  Singulair and Nasonex. ALLERGIES:  No known drug allergies. SOCIAL HISTORY:  Born in [place]. Three half sisters. Parents are  . Growing up was \"normal.\"  No abuse. He graduated high school. He has 1 year at [employer]. 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 He lives with his parents in [place]. He has no kids. He has never  been . LEGAL HISTORY:  None. REVIEW OF SYSTEMS:  None. MENTAL STATUS EXAMINATION:  The patient presented in personal clothes. He was somewhat guarded, but pleasant. He had fair eye contact. He  described his mood as \"down\" and had a blunted affect. He had moderate  psychomotor retardation. He spoke softly. He was not pressured. He was oriented to the date,  day, place, and the context of this evaluation. His memory was intact. His use of language, speech, and educational attainment suggested an  average level of intellectual functioning. His thought processes were logical and goal-directed. He did not  describe or endorse hallucinations, delusions, or homicidal thinking. He did endorse recent suicidal thinking with fantasies about how he  would do it.   That said, he reported feeling safe here and and more than 50% of the time was spent in completing this  evaluation, providing counseling, and planning treatment with the  patient.         Bentley Nyhan, MD    D: 07/31/2020 11:37:09       T: 07/31/2020 11:46:45     [id number]  Job#: [id number]     Doc#: [id number]    CC:

## 2020-07-31 NOTE — PROGRESS NOTES
relaxation techniques, changing routine, distraction)                                                           ( )  Basic information about quitting (benefits of quitting, techniques in how to quit, available resources  ( ) Referral for counseling faxed to [facility]                                           ( ) Patient refused counseling  (X ) Patient has not smoked in the last 30 days    Metabolic Screening:    No results found for: LABA1C    No results found for: CHOL  No results found for: TRIG  No results found for: HDL  No components found for: LDLCAL  No results found for: LABVLDL      Body mass index is 27.32 kg/m². BP Readings from Last 2 Encounters:   07/30/20 134/67   07/30/20 106/65           Pt admitted with followings belongings:  Dentures: None  Vision - Corrective Lenses: None  Hearing Aid: None  Jewelry: None  Body Piercings Removed: N/A  Clothing: Pants, Shirt, Footwear, Socks, Undergarments (Comment), Other (Comment)(face shield)  Were All Patient Medications Collected?: Not Applicable  Other Valuables: None     Patient belongings placed in patient locker or given to patient. No ,edications were brought with patent to the hospital. .  Patient oriented to surroundings and program expectations and copy of patient rights given. Received admission packet:  YES. Consents reviewed, signed YES. Refused NO. Patient verbalize understanding:  YES. Patient education on precautions: YES    Goal for hospitalization:\" I'm not sure, I want to feel better. I'd like help with my anxiety and depression. \"            Patient strengths: employed, able to verbalize needs, positive leisure time activities, I have above a verage intelligence. \"  Limitations: \"poor self esteem. \"         Geraldo Coronado RN

## 2020-07-31 NOTE — PROGRESS NOTES
Patient arrived from [facility] emergency room. He arrives on a stretcher with ambulance personal in attendance. He is alert and oriented and cooperative with vital signs.

## 2020-08-01 PROCEDURE — 1240000000 HC EMOTIONAL WELLNESS R&B

## 2020-08-01 PROCEDURE — 6370000000 HC RX 637 (ALT 250 FOR IP): Performed by: PSYCHIATRY & NEUROLOGY

## 2020-08-01 PROCEDURE — 99233 SBSQ HOSP IP/OBS HIGH 50: CPT | Performed by: PSYCHIATRY & NEUROLOGY

## 2020-08-01 RX ADMIN — SERTRALINE HYDROCHLORIDE 50 MG: 50 TABLET ORAL at 11:47

## 2020-08-01 NOTE — PROGRESS NOTES
Department of Psychiatry  Progress Note    Patient's chart was reviewed. Discussed with treatment team. Met with patient. SUBJECTIVE:     Continues with significant symptoms of depression and anxiety. Isolative but attends some groups. Tolerating Zoloft well and without side effects. ROS:   Patient has new complaints: no  Sleeping adequately:  Yes   Appetite adequate: Yes  Engaged in programming: present but not engaged    OBJECTIVE:  VITALS:  /71   Pulse 77   Temp 98 °F (36.7 °C) (Temporal)   Resp 16   Ht 5' 9\" (1.753 m)   Wt 185 lb (83.9 kg)   SpO2 97%   BMI 27.32 kg/m²     Mental Status Examination:    Appearance: fair grooming and hygiene  Behavior/Attitude toward examiner:   fair eye contact  Speech: paucity  Mood:  \"same\"  Affect:  blunted     Thought processes:  Goal directed, linear, no KATHERINE or gross disorganization  Thought Content: less SI, no HI, no delusions voiced, no obsessions  Perceptions: no AVH  Attention: attention span and concentration were intact to interview   Abstraction: intact  Cognition:  Alert and oriented to person, place, time, and situation, recall intact  Insight: fair   Judgment: impaired    Medication:  Scheduled:   sertraline  25 mg Oral Daily    traZODone  50 mg Oral Once        PRN:  acetaminophen, LORazepam, magnesium hydroxide, aluminum & magnesium hydroxide-simethicone     ASSESSMENT AND PLAN:    Principal Problem:    Current severe episode of major depressive disorder without psychotic features without prior episode ([facility] [address]  Active Problems:    LUIS CARLOS (generalized anxiety disorder)    Leukocytosis  Resolved Problems:    * No resolved hospital problems. *     PLAN:  1. Admit to inpatient psychiatry for stabilization and treatment. 2.  On admission, started Zoloft 25 mg daily for the treatment of depression and  anxiety. Ordered q.15-minute checks for safety, programming, and p.r.n.  medication for anxiety, agitation, and insomnia.     8/1/2020 - increase Zoloft to 50mg daily. 3.  Internal Medicine consult for admission. Leukocytosis   - mild   - no acute infectious source    4. Collateral information if available for diagnostic clarification and  care coordination. 5.  Estimated length of stay, 4 to 5 days for stabilization. Voluntary. Total time with patient was 35 minutes and more than 50 % of that time was spent counseling the patient on their symptoms, treatment, and expected goals.      Satish Allen MD, [address] [place] [address],Third Floor, [place]

## 2020-08-01 NOTE — BH NOTE
Purposeful Rounding    Patient Location Patient room    Patient willing to engage in conversationYes    Presentation/behavior Guarded/Suspicious    Affect Constricted    Concerns reported: None    PRN medications given:None    Effectiveness of PRN medication given: N/A    Environmental assessment No safety hazards noted    Fall prevention interventions in place: Low risk

## 2020-08-01 NOTE — GROUP NOTE
Group Therapy Note    Date: 8/1/2020    Group Start Time: 0110  Group End Time: 0200  Group Topic: Psychotherapy    [address]        Group Therapy Note    Attendees: 5                Patient's Goal:  Pt to engage in communication and talk about needs in a relationship as well as self awareness     Notes:  Pt was engaged in group and able to communicate. Pt was able to participate and offer insight     Status After Intervention:  Improved    Participation Level:  Active Listener and Interactive    Participation Quality: Appropriate and Attentive      Speech:  normal      Thought Process/Content: Logical  Linear      Affective Functioning: Congruent      Mood: anxious      Level of consciousness:  Alert and Oriented x4      Response to Learning: Able to verbalize current knowledge/experience and Capable of insight      Endings: None Reported    Modes of Intervention: Education and Support      Discipline Responsible: /Counselor      Signature:  Ab De La Vega

## 2020-08-02 PROCEDURE — 6370000000 HC RX 637 (ALT 250 FOR IP): Performed by: PSYCHIATRY & NEUROLOGY

## 2020-08-02 PROCEDURE — 1240000000 HC EMOTIONAL WELLNESS R&B

## 2020-08-02 RX ADMIN — SERTRALINE HYDROCHLORIDE 50 MG: 50 TABLET ORAL at 08:12

## 2020-08-02 NOTE — BH NOTE
Purposeful Rounding    Patient Location Patient room    Patient willing to engage in conversationYes    Presentation/behavior Cooperative and Pleasant    Affect Neutral/Euthymic(normal)    Concerns reported: None    PRN medications given:None    Effectiveness of PRN medication given: N/A    Environmental assessment No safety hazards noted    Fall prevention interventions in place: Low risk

## 2020-08-02 NOTE — GROUP NOTE
Group Therapy Note    Date: 8/2/2020    Group Start Time: 0110  Group End Time: 0200  Group Topic: Psychotherapy    [address]        Group Therapy Note    Attendees: 9            Patient's Goal:  Pt to engage in group activity talking about triggers and using coping skills. Pt to identify currently coping skills and ones that pt would like to try    Notes: Pt was able to engage and stay active. Pt was able to gain insight and offer insight. Pt identified biggest trigger and how pt will work to use coping skills to prevent relapse    Status After Intervention:  Improved    Participation Level:  Active Listener    Participation Quality: Appropriate and Attentive      Speech:  normal      Thought Process/Content: Logical  Linear      Affective Functioning: Congruent      Mood: depressed      Level of consciousness:  Alert and Oriented x4      Response to Learning: Able to verbalize current knowledge/experience and Able to verbalize/acknowledge new learning      Endings: None Reported    Modes of Intervention: Education and Support      Discipline Responsible: /Counselor      Signature:  Zia Agosto

## 2020-08-02 NOTE — PLAN OF CARE
Problem: Anxiety:  Goal: Level of anxiety will decrease  Description: Level of anxiety will decrease  8/2/2020 1011 by Bishop Wood RN  Outcome: Ongoing  Note: Pt reports anxiety has greatly improved today. Denies SI/HI/AVH. Mostly isolated to room but is attending and participating in groups. Pleasant and cooperative with staff, no outbursts.

## 2020-08-02 NOTE — BH NOTE
Group Therapy Note    Date: 8/1/2020  Start Time: 20:00  End Time:  21:00  Number of Participants: 12    Type of Group: Recreational  Wrap up    Emamnuel Auguste Information  Module Name:  /  Session Number:  /    Patient's Goal:  read    Notes:  Goal completed    Status After Intervention:  Unchanged    Participation Level:  Active Listener and Interactive    Participation Quality: Appropriate and Attentive      Speech:  pressured      Thought Process/Content: Logical      Affective Functioning: Blunted      Mood: anxious      Level of consciousness:  Alert and Attentive      Response to Learning: Able to change behavior      Endings: None Reported    Modes of Intervention: Socialization and Problem-solving      Discipline Responsible: [address], [employer] [place] [employer]      Signature:  Sofiya Ferreira

## 2020-08-02 NOTE — BH NOTE
Purposeful Rounding    Patient Location Patient room    Patient willing to engage in conversationYes    Presentation/behavior Cooperative and Pleasant    Affect Neutral/Euthymic(normal)    Concerns reported: None, reports feeling better    PRN medications given: None    Effectiveness of PRN medication given: N/A    Environmental assessment No safety hazards noted    Fall prevention interventions in place: Low risk

## 2020-08-02 NOTE — BH NOTE
[facility]  Day 3 Interdisciplinary Treatment Plan NOTE    Review Date & Time: 08/02/2020 0900    Patient was not in treatment team    Admission Type:   Admission Type: Voluntary(signed voluntary upon arrival to the unit.)    Reason for admission:  Reason for Admission: Patient told staff at [facility] that he was suicidal and homocidal, was thinking about self destruction by running in to traffic and getting hit by a car, or starting arguement with someone to become involved in an assault. patient is feeling anxious and depressed. He is unable to identify why. He is in counseling at [facility]. Estimated Length of Stay Update:  1-3 days   Estimated Discharge Date Update: 1-3 days     PATIENT STRENGTHS:  Patient Strengths Strengths: No significant Physical Illness, Other(Pt reported having above average intelligence.)  Patient Strengths and Limitations:Limitations: Difficult relationships / poor social skills, Tendency to isolate self, Demonstrates discomfort with /lack of social skills  Addictive Behavior:Addictive Behavior  In the past 3 months, have you felt or has someone told you that you have a problem with:  : Internet Use(Pt reported parents expressing concern over amount of time playing video games.)  Do you have a history of Chemical Use?: No  Do you have a history of Alcohol Use?: No  Do you have a history of Street Drug Abuse?: No  Histroy of Prescripton Drug Abuse?: No  Medical Problems:  Past Medical History:   Diagnosis Date    Environmental allergies        Risk:  Fall RiskTotal: 53  Talat Scale Talat Scale Score: 21  BVC Total: 0  Change in scores NO.  Changes to plan of Care  NO    Status EXAM:   Status and Exam  Normal: No  Facial Expression: (varied appropriately)  Affect: Appropriate, Stable  Level of Consciousness: Alert  Mood:Normal: No  Mood: Depressed  Motor Activity:Normal: No  Motor Activity: Decreased  Interview Behavior: Cooperative  Preception: [place] to Person, Scharlene Huger to Time, [place] to Place, [place] to Situation  Attention:Normal: Yes  Attention: Distractible  Thought Processes: Circumstantial  Thought Content:Normal: Yes  Hallucinations: None  Delusions: No  Memory:Normal: Yes  Insight and Judgment: No  Insight and Judgment: Poor Judgment, Poor Insight  Present Suicidal Ideation: No  Present Homicidal Ideation: No    Daily Assessment Last Entry:   Daily Sleep (WDL): Within Defined Limits         Patient Currently in Pain: No  Daily Nutrition (WDL): Within Defined Limits    Patient Monitoring:  Frequency of Checks: 4 times per hour, close    Psychiatric Symptoms:   Depression Symptoms  Depression Symptoms: Isolative, Feelings of helplessness  Anxiety Symptoms  Anxiety Symptoms: Generalized  Aiyana Symptoms  Aiyana Symptoms: No problems reported or observed. Psychosis Symptoms  Delusion Type: No problems reported or observed. Suicide Risk CSSR-S:  1) Within the past month, have you wished you were dead or wished you could go to sleep and not wake up? : Yes  2) Have you actually had any thoughts of killing yourself? : Yes  3) Have you been thinking about how you might kill yourself? : Yes  5) Have you started to work out or worked out the details of how to kill yourself?  Do you intend to carry out this plan? : Yes  6) Have you ever done anything, started to do anything, or prepared to do anything to end your life?: No  Change in Result NO Change in Plan of care NO      EDUCATION:   Learner Progress Toward Treatment Goals: Reviewed results and recommendations of this team    Method: Individual    Outcome: Verbalized understanding    PATIENT GOALS: \" attend group\"     PLAN/TREATMENT RECOMMENDATIONS UPDATE:maintain safety, medication management     GOALS UPDATE:   Time frame for Short-Term Goals:  By time of discharge       Mina Stewart RN

## 2020-08-02 NOTE — PLAN OF CARE
Pt was mostly isolative to room, reading. Pleasant upon approach. Attended group. Denied SI/HI. Denied hallucinations, paranoia. No complaints.

## 2020-08-02 NOTE — BH NOTE
Purposeful Rounding    Patient Location Day room    Patient willing to engage in conversationYes    Presentation/behavior Cooperative and Pleasant    Affect Neutral/Euthymic(normal)    Concerns reported: None    PRN medications given: None    Effectiveness of PRN medication given: N/A    Environmental assessment No safety hazards noted    Fall prevention interventions in place: Low risk

## 2020-08-03 VITALS
HEIGHT: 69 IN | BODY MASS INDEX: 27.4 KG/M2 | TEMPERATURE: 97.7 F | SYSTOLIC BLOOD PRESSURE: 148 MMHG | OXYGEN SATURATION: 97 % | HEART RATE: 100 BPM | RESPIRATION RATE: 16 BRPM | WEIGHT: 185 LBS | DIASTOLIC BLOOD PRESSURE: 63 MMHG

## 2020-08-03 PROCEDURE — 99239 HOSP IP/OBS DSCHRG MGMT >30: CPT | Performed by: PSYCHIATRY & NEUROLOGY

## 2020-08-03 PROCEDURE — 5130000000 HC BRIDGE APPOINTMENT

## 2020-08-03 PROCEDURE — 6370000000 HC RX 637 (ALT 250 FOR IP): Performed by: PSYCHIATRY & NEUROLOGY

## 2020-08-03 RX ADMIN — SERTRALINE HYDROCHLORIDE 50 MG: 50 TABLET ORAL at 08:59

## 2020-08-03 NOTE — GROUP NOTE
Group Therapy Note    Date: 8/3/2020    Group Start Time: 1000  Group End Time: 1100  Group Topic: [facility]        Group Therapy Note    Attendees: 12         Patient's Goal:  Patients were invited to participate in an Art Therapy group on coping skills. Patients received a handout and were invited to discuss the different categories of coping strategies - such as distracting, grounding, thought challenging, self-love - specific coping skill techniques, and the pros and cons of each coping skills category. Through art making, patients were asked to reflect on coping strategies they could utilize to meet their personal needs and challenges, and were asked to be as specific as possible, including how, when, where, and why the could use the identified coping strategy. At the end of session, patients were encouraged to share and process their coping skills artwork with the group. Notes:  Walker Woodson appeared attentive to reviewing the handout on coping strategies with the group, participated in art making through writing and discussion with therapist, and listened to peers as they shared their work. Dylon identified joining a book club and a writing group as two self-care activities he would like to participate in. Status After Intervention:  Unchanged    Participation Level:  Active Listener and Interactive    Participation Quality: Appropriate, Attentive and Sharing      Speech:  normal      Thought Process/Content: Linear      Affective Functioning: Blunted      Mood: depressed      Level of consciousness:  Alert, Oriented x4 and Attentive      Response to Learning: Able to verbalize current knowledge/experience, Able to verbalize/acknowledge new learning, Able to retain information and Capable of insight      Endings: None Reported    Modes of Intervention: Education, Support, Socialization, Exploration, Problem-solving, Activity and Media      Discipline Responsible: Psychoeducational Specialist      Signature:  Eda Perez, [address]

## 2020-08-03 NOTE — PLAN OF CARE
Pt has been isolative to room, reading. Brightened upon approach. Denied SI/HI. Denied hallucinations, paranoia. Pleased to be going home today, lives with parents, said is a good situation. Said that Dr Bonnie New PCP will order his meds and that has a therapist Silva Begum at [facility]. Cheerful, reported feels ready to go , meds help. Has job as part time supervisor at [employer], stressful job. No complaints.

## 2020-08-03 NOTE — BH NOTE
[address] [facility]  Discharge Note    Pt discharged with followings belongings:   Dentures: None  Vision - Corrective Lenses: None  Hearing Aid: None  Jewelry: None  Body Piercings Removed: N/A  Clothing: Pants, Shirt, Footwear, Socks, Undergarments (Comment), Other (Comment)(face shield)  Were All Patient Medications Collected?: Not Applicable  Other Valuables: None   Valuables sent home with patient. Valuables retrieved from safe and returned to patient. Patient education on aftercare instructions: yes. Information faxed to N/A by N/A. Patient verbalize understanding of AVS:  yes. Status EXAM upon discharge:  Status and Exam  Normal: Yes  Facial Expression: Flat  Affect: Incongruent  Level of Consciousness: Alert  Mood:Normal: No  Mood: Depressed  Motor Activity:Normal: No  Motor Activity: Decreased  Interview Behavior: Cooperative  Preception: [place] to Person, Michael Sees to Time, [place] to Place, [place] to Situation  Attention:Normal: Yes  Attention: Distractible  Thought Processes: Circumstantial  Thought Content:Normal: No  Hallucinations: None  Delusions: No  Memory:Normal: Yes  Insight and Judgment: No  Insight and Judgment: Poor Judgment  Present Suicidal Ideation: No  Present Homicidal Ideation: No      Metabolic Screening:    No results found for: LABA1C    No results found for: CHOL  No results found for: TRIG  No results found for: HDL  No components found for: LDLCAL  No results found for: Aubrie Newby RN    Bridge Appointment completed: Reviewed Discharge Instructions with patient. Patient verbalizes understanding and agreement with the discharge plan using the teachback method.      Referral for Outpatient Tobacco Cessation Counseling, upon discharge ([place] X if applicable and completed):    ( )  Hospital staff assisted patient to call Quit Line or faxed referral                                   during hospitalization                  (X) Recognizing danger situations (included triggers and roadblocks), if not completed on admission                    (X)  Coping skills (new ways to manage stress, exercise, relaxation techniques, changing routine, distraction), if not completed on admission                                                           (X)  Basic information about quitting (benefits of quitting, techniques in how to quit, available resources, if not completed on admission  ( ) Referral for counseling faxed to [facility]   ( ) Patient refused referral  ( ) Patient refused counseling  ( ) Patient refused smoking cessation medication upon discharge

## 2020-08-03 NOTE — GROUP NOTE
Group Therapy Note    Date: 8/3/2020    Group Start Time: 1946  Group End Time: 1200  Group Topic: Recreational    [facility]        Group Therapy Note    Attendees: 10    Patient's Goal: to participate in a [employer] Class to improve mobility, balance, self-esteem, mood, and overall health, wellness, and quality of life. To complete and discuss a leisure benefit work sheet, to increase awareness of the benefits for participating in leisure. Notes: Dylon actively participated in group activity and contributed to group discussion. Dylon completed all movement directives. Dylon completed worksheet and voiced understanding of education topic. Status After Intervention:  Improved    Participation Level:  Active Listener and Interactive    Participation Quality: Appropriate, Attentive and Sharing      Speech:  normal      Thought Process/Content: Logical      Affective Functioning: Congruent      Mood: euthymic      Level of consciousness:  Alert, Oriented x4 and Attentive      Response to Learning: Able to verbalize current knowledge/experience, Able to verbalize/acknowledge new learning and Progressing to goal      Endings: None Reported    Modes of Intervention: Education, Support, Socialization, Exploration, Clarifying, Problem-solving, Activity and Movement      Discipline Responsible: Psychoeducational Specialist      Signature:  Reji Hoff

## 2020-08-03 NOTE — PLAN OF CARE
Problem: Anxiety:  Goal: Level of anxiety will decrease  Description: Level of anxiety will decrease  8/3/2020 1025 by Marli Medina LPN  Outcome: Ongoing  8/3/2020 0102 by Fara White LPN  Outcome: Ongoing  Out for group. Quiet.  Denied SI.

## 2020-08-05 NOTE — DISCHARGE SUMMARY
Discharge Summary   Admit Date: 7/30/2020   Discharge Date:  8/3/2020  Spent over 40 minutes with patient and staff on [facility]   Final Dx:   Axis I: MDD severe no psychosis, micah  Axis II: deferred  Axis III: see medical hx  Axis IV: poor coping skills, occupational and psychosocial stressors    Condition on DC  Mood and affect are stable and pt is not suicidal   VITALS:  BP (!) 148/63   Pulse 100   Temp 97.7 °F (36.5 °C) (Temporal)   Resp 16   Ht 5' 9\" (1.753 m)   Wt 185 lb (83.9 kg)   SpO2 97%   BMI 27.32 kg/m²   Brief Summary Present Illness   The patient reports that over the last year  his depression has gotten increasingly worse. He describes low mood,  anhedonia, fatigue, decreased concentration, self-reproach, amotivation,  tearfulness, excessive guilt, initiation and maintenance insomnia, and  suicidality. He has fantasies about not existing anymore; that people will be better off without him. He has thoughts about walking into traffic or in front of a baggage cart at work.  HOSP [facility] Course Pt was admitted for worsening depression and si. Pt was started on zoloft which he appears to have tolerated well and he did attend grps and participated in milieu tx. Pt had uneventful hospital course and quick recovery. Patient appears to be in stable condition and close to their baseline functioning. The patient denies suicidal or homicidal ideations and is showing future orientation. Patient no longer presented an imminent risk of danger to themselves and/or others. At the time of discharge it appears that the patient has received the maximum medical benefit from this hospitalization and can be appropriately managed with community treatment.       PE: (reviewed) and labs (see medical H&PE)  Labs:    Admission on 07/30/2020, Discharged on 08/03/2020   Component Date Value Ref Range Status    Ventricular Rate 07/31/2020 80  BPM Final    Atrial Rate 07/31/2020 80  BPM Final    P-R Interval 07/31/2020 160  ms Final    QRS Duration 07/31/2020 92  ms Final    Q-T Interval 07/31/2020 388  ms Final    QTc Calculation (Bazett) 07/31/2020 447  ms Final    P Axis 07/31/2020 28  degrees Final    R Axis 07/31/2020 79  degrees Final    T Axis 07/31/2020 64  degrees Final    Diagnosis 07/31/2020 Normal sinus rhythmNormal ECGNo previous ECGs availableConfirmed by Kassandra Hand MD, Luz Maria Mcdaniel ([id number]) on 7/31/2020 5:47:49 PM   Final        Mental Status Exam at Discharge:  Level of consciousness:  awake  Appearance:  well-appearing, in chair, good grooming and good hygiene well-developed, well-nourished  Behavior/Motor:  no abnormalities noted normal gait and station AIMS: 0  Attitude toward examiner:  cooperative, attentive and good eye contact  Speech:  spontaneous, normal rate, normal volume and well articulated  Mood:  dysthymic  Affect:  mood congruent Anxiety: mild  Hallucinations: Absent  Thought processes:  coherent Attention span, Concentration & Attention:  attention span and concentration were age appropriate  Thought content:  Reality based no evidence of delusions OCD: none    Insight: normal insight and judgment Cognition:  oriented to person, place, and time  Fund of Knowledge: average  IQ:average Memory: intact  Suicide:  No specific plan to harm self  Sleep: sleeps through the night  Appetite: ok   Reassess Donna Risk:  no specific plan to harm self Pt has phone numbers to contact if suicidal thoughts recur and states pt will return to the hospital if suicidal feelings return.    Hospital Routine Meds:     Hospital PRN Meds:    Discharge Meds:    Discharge Medication List as of 8/3/2020 12:33 PM           Details   sertraline (ZOLOFT) 50 MG tablet Take 1 tablet by mouth daily, Disp-30 tablet,R-0Normal                   Disposition - Residence      Follow Up:  See Discharge Instructions